# Patient Record
(demographics unavailable — no encounter records)

---

## 2024-12-19 NOTE — HISTORY OF PRESENT ILLNESS
[de-identified] : 54-year-old female presents as new patient for evaluation of left-sided leg pain.  She describes a relatively acute onset of the symptoms approximately 11/24/2024.  She describes a pressure and aching left-sided low back radiating down the left leg causing severe pain and difficulty walking for several days.  This is involving the glued hamstring calf and the plantar aspect of the foot and lateral 2 toes where is that there is some numbness.  She is walking with a slight limp.  She is taking meloxicam and gabapentin which do help.  Overall she feels that her symptoms are improved have improved significantly.  Has also completed 4 sessions of physical therapy

## 2024-12-19 NOTE — ASSESSMENT
[FreeTextEntry1] : 54-year-old female with approximately 3.5 weeks of severe left-sided S1 radiculopathy in the setting of a large left-sided L5-S1 disc herniation Family

## 2024-12-19 NOTE — DISCUSSION/SUMMARY
[de-identified] : Reviewed the diagnosis and natural history as well as my impression of the imaging her symptoms.  Given her significant improvement over the last 2 weeks I recommended ongoing conservative management including medications as needed for pain and regular physical therapy.  We discussed ways to mitigate her symptoms including avoiding prolonged sitting position which she is frequently required to do at work.  I would like her to keep an eye on the strength in the left calf and we will plan to follow-up in 1 to 2 weeks for repeat evaluation and physical exam.  I have spent greater than 60 minutes preparing to see the patient, collecting relevant history, performing a thorough history and physical examination, counseling the patient regarding my findings ordering the appropriate therapies and tests, communicating with other relevant healthcare professionals, documenting my encounter and coordinating care.

## 2024-12-19 NOTE — PHYSICAL EXAM
[Antalgic] : antalgic [UE/LE] : Sensory: Intact in bilateral upper & lower extremities [Normal DTR Reflexes] : DTR reflexes normal [Normal Proprioception] : sensation intact for proprioception [Normal] : Oriented to person, place, and time, insight and judgement were intact and the affect was normal [de-identified] : Able to single heel rise left with diminished strength relative to right [de-identified] : 4 view lumbar spine x-ray PACS 12/19/2024 Alignment maintained with no evidence of instability.  Minimal spondylotic change  MRI lumbar spine [facility] 12/6/2024 At L5-S1 there is a large left-sided paracentral disc extrusion effacing the traversing S1 root

## 2025-01-09 NOTE — DISCUSSION/SUMMARY
[de-identified] : Overall I am pleased with her improvement since her last visit and that this is consistent with a resolving radiculopathy.  We discussed anticipated time course and rate of improvement as well as surgical indications.  For now we will continue with physical therapy and observation and she will keep us informed of her progress in the coming weeks   I have spent greater than 45 minutes preparing to see the patient, collecting relevant history, performing a thorough history and physical examination, counseling the patient regarding my findings ordering the appropriate therapies and tests, communicating with other relevant healthcare professionals, documenting my encounter and coordinating care.

## 2025-01-09 NOTE — PHYSICAL EXAM
[Antalgic] : antalgic [UE/LE] : Sensory: Intact in bilateral upper & lower extremities [Normal DTR Reflexes] : DTR reflexes normal [Normal Proprioception] : sensation intact for proprioception [Normal] : Oriented to person, place, and time, insight and judgement were intact and the affect was normal [de-identified] : Able to single heel rise left with diminished endurance relative to right [de-identified] : 4 view lumbar spine x-ray PACS 12/19/2024 Alignment maintained with no evidence of instability.  Minimal spondylotic change  MRI lumbar spine [facility] 12/6/2024 At L5-S1 there is a large left-sided paracentral disc extrusion effacing the traversing S1 root

## 2025-01-09 NOTE — HISTORY OF PRESENT ILLNESS
[de-identified] : Returns today regarding left-sided S1 radiculopathy.  Has noticed steady improvement in pain and strength in the left leg.  Occasionally still some pain in the left hamstring.  Denies any low back pain.  Still some numbness in the lateral border of the left foot.  Her gait has improved.  She has been completing regular physical therapy.  Occasionally taking Tylenol only for pain as needed

## 2025-01-09 NOTE — ASSESSMENT
[FreeTextEntry1] : 54-year-old female with approximately 6 weeks of severe left-sided S1 radiculopathy in the setting of a large left-sided L5-S1 disc herniation I have personally performed a face to face diagnostic evaluation on this patient. I have reviewed the ACP note and agree with the history, exam and plan of care, except as noted.